# Patient Record
Sex: MALE | Race: WHITE | Employment: FULL TIME | URBAN - METROPOLITAN AREA
[De-identification: names, ages, dates, MRNs, and addresses within clinical notes are randomized per-mention and may not be internally consistent; named-entity substitution may affect disease eponyms.]

---

## 2022-09-01 ENCOUNTER — OFFICE VISIT (OUTPATIENT)
Dept: ORTHOPEDIC SURGERY | Age: 27
End: 2022-09-01
Payer: COMMERCIAL

## 2022-09-01 DIAGNOSIS — M79.644 PAIN OF FINGER OF RIGHT HAND: Primary | ICD-10-CM

## 2022-09-01 DIAGNOSIS — S61.202A UNSPECIFIED OPEN WOUND OF RIGHT MIDDLE FINGER WITHOUT DAMAGE TO NAIL, INITIAL ENCOUNTER: ICD-10-CM

## 2022-09-01 PROCEDURE — 99204 OFFICE O/P NEW MOD 45 MIN: CPT | Performed by: NURSE PRACTITIONER

## 2022-09-01 RX ORDER — SULFAMETHOXAZOLE AND TRIMETHOPRIM 800; 160 MG/1; MG/1
1 TABLET ORAL 2 TIMES DAILY
Qty: 14 TABLET | Refills: 0 | Status: SHIPPED | OUTPATIENT
Start: 2022-09-01 | End: 2022-09-08

## 2022-09-01 RX ORDER — ESCITALOPRAM OXALATE 20 MG/1
20 TABLET ORAL DAILY
COMMUNITY

## 2022-09-01 NOTE — PROGRESS NOTES
Orthopaedic Hand Clinic Note    Name: Sejal Lovelace  YOB: 1995  Gender: male  MRN: [id number]      CC: Patient referred for evaluation of right middle finger injury    HPI: Sejal Lovelace is a 32 y.o. male right hand dominant with a chief complaint of right middle finger injury. He reports he was rock climbing indoors approximately 2 weeks ago. He said he felt like he rubbed the skin causing a blister. He said he felt like the skin tore. He said since then he has had a red sensitive finger. He felt like something was trapped underneath. He said he tried to open it up. He said he got a small white object out. He says he thinks it was skin. He was seen in urgent care last night. He was referred to orthopedics for follow-up. He denies fever or chills. He has not been on any antibiotics. ROS/Meds/PSH/PMH/FH/SH: I personally reviewed the patients standard intake form. Pertinents are discussed in the HPI    Physical Examination:  General: Awake and alert. HEENT: Normocephalic, atraumatic  CV/Pulm: Breathing even and unlabored  Skin: No obvious rashes noted. Lymphatic: No obvious evidence of lymphedema or lymphadenopathy    Musculoskeletal Exam:  Examination on the right upper extremity demonstrates cap refill < 5 seconds in all fingers,   Patient has mild swelling to the right middle finger. There is a circular wound on the volar aspect of the right middle finger over the middle phalanx. There is some erythema. There is no drainage. He is tender to this area. He has decreased range of motion secondary to pain and swelling. He has full extension of his digits. All tendons are working. He denies paresthesia. He has good capillary refill. Imaging / Electrodiagnostic Tests:     Strays include a 3 view right middle finger are reviewed. No abnormality noted. Assessment:   1. Pain of finger of right hand    2.  Unspecified open wound of right middle finger without damage to nail, initial encounter        Plan:   We discussed the diagnosis and different treatment options. We discussed observation, therapy, antiinflammatory medications and other pertinent treatment modalities. After discussing in detail the patient elects to proceed with Xeroform dressing changes. We will give him a short course of Bactrim. I explained he needs to contact our office immediately if he develops increased pain, redness, drainage. I will see him back Monday or Tuesday to reassess his progress. We did discuss surgical intervention if this gets infected. Patient voiced accordance and understanding of the information provided and the formulated plan. All questions were answered to the patient's satisfaction during the encounter.     4 This is an acute complicated injury  Treatment at this time: Prescription medication and dressing changes    GABE Izquierdo CNP  Orthopaedic Surgery  09/01/22  8:47 AM

## 2022-09-06 ENCOUNTER — OFFICE VISIT (OUTPATIENT)
Dept: ORTHOPEDIC SURGERY | Age: 27
End: 2022-09-06
Payer: COMMERCIAL

## 2022-09-06 DIAGNOSIS — S61.202A UNSPECIFIED OPEN WOUND OF RIGHT MIDDLE FINGER WITHOUT DAMAGE TO NAIL, INITIAL ENCOUNTER: ICD-10-CM

## 2022-09-06 DIAGNOSIS — M79.644 PAIN OF FINGER OF RIGHT HAND: Primary | ICD-10-CM

## 2022-09-06 PROCEDURE — 99214 OFFICE O/P EST MOD 30 MIN: CPT | Performed by: NURSE PRACTITIONER

## 2022-09-06 RX ORDER — SULFAMETHOXAZOLE AND TRIMETHOPRIM 800; 160 MG/1; MG/1
1 TABLET ORAL 2 TIMES DAILY
Qty: 14 TABLET | Refills: 0 | Status: SHIPPED | OUTPATIENT
Start: 2022-09-06 | End: 2022-09-13

## 2022-09-06 RX ORDER — HYDROCODONE BITARTRATE AND ACETAMINOPHEN 5; 325 MG/1; MG/1
1 TABLET ORAL EVERY 6 HOURS PRN
Qty: 20 TABLET | Refills: 0 | Status: SHIPPED | OUTPATIENT
Start: 2022-09-06 | End: 2022-09-11

## 2022-09-07 PROBLEM — S61.202A UNSPECIFIED OPEN WOUND OF RIGHT MIDDLE FINGER WITHOUT DAMAGE TO NAIL, INITIAL ENCOUNTER: Status: ACTIVE | Noted: 2022-09-07

## 2022-09-07 PROBLEM — M79.644 PAIN OF FINGER OF RIGHT HAND: Status: ACTIVE | Noted: 2022-09-07

## 2022-09-07 NOTE — PROGRESS NOTES
Orthopaedic Hand Clinic Note    Name: Favian Lyles  YOB: 1995  Gender: male  MRN: [id number]       Follow up visit:   1. Pain of finger of right hand    2. Unspecified open wound of right middle finger without damage to nail, initial encounter        HPI: Favian Lyles is a 32 y.o. male who is following up for right middle finger wound. He has been on Bactrim for the last week. He notes that pus came out of his wound last night. He denies fever or chills. .      ROS/Meds/PSH/PMH/FH/SH: I personally reviewed the patients standard intake form. Pertinents are discussed in the HPI    Physical Examination:    Musculoskeletal Examination:  Examination on the right upper extremity demonstrates cap refill < 5 seconds in all fingers,   Patient has mild swelling to the right middle finger. There is a circular wound on the volar aspect of the right middle finger over the middle phalanx. There is surrounding erythema. There is no drainage. He is tender to this area. He has decreased range of motion secondary to pain and swelling. He has full extension of his digits. All tendons are working. He denies paresthesia. He has good capillary refill. This appears worse than his last visit. Imaging / Electrodiagnostic Tests:     None    Assessment:     ICD-10-CM    1. Pain of finger of right hand  M79.644 sulfamethoxazole-trimethoprim (BACTRIM DS) 800-160 MG per tablet     HYDROcodone-acetaminophen (NORCO) 5-325 MG per tablet      2. Unspecified open wound of right middle finger without damage to nail, initial encounter  S61.202A sulfamethoxazole-trimethoprim (BACTRIM DS) 800-160 MG per tablet     HYDROcodone-acetaminophen (NORCO) 5-325 MG per tablet          Plan:   We discussed the diagnosis and different treatment options. We discussed observation, therapy, antiinflammatory medications and other pertinent treatment modalities.     After discussing in detail the patient elects to proceed with irrigation and debridement by Dr. Naveen Thomas at the bedside. He will continue Bactrim. We will give him something for pain. We will reassess his wound on Friday September the night. After informed consent patient's right middle finger was prepped and draped sterile technique. .  8 cc of 2% lidocaine were injected prior to procedure. An incision was made over the wound. This was irrigated. His wound was left open. No foreign object was identified. A dressing consisting of Xeroform gauze, 4 x 4, Taylor Muta was applied. Patient tolerated well. Patient voiced accordance and understanding of the information provided and the formulated plan. All questions were answered to the patient's satisfaction during the encounter.     4 This is an acute complicated injury  Treatment at this time: Prescription medication and irrigation and debridement    GABE Archibald CNP  Orthopaedic Surgery  09/07/22  8:11 AM

## 2022-09-09 ENCOUNTER — OFFICE VISIT (OUTPATIENT)
Dept: ORTHOPEDIC SURGERY | Age: 27
End: 2022-09-09
Payer: COMMERCIAL

## 2022-09-09 DIAGNOSIS — S61.202A UNSPECIFIED OPEN WOUND OF RIGHT MIDDLE FINGER WITHOUT DAMAGE TO NAIL, INITIAL ENCOUNTER: Primary | ICD-10-CM

## 2022-09-09 PROCEDURE — 99213 OFFICE O/P EST LOW 20 MIN: CPT | Performed by: NURSE PRACTITIONER

## 2022-09-09 NOTE — LETTER
[address],[facility] [address]  Phone: [phone]  Fax: [phone]    GABE Dailey CNP        September 9, 2022     Patient: Jun Bowles   YOB: 1995   Date of Visit: 9/9/2022       To Whom It May Concern: It is my medical opinion that Jun Bowles may return to work on 9/12/22. If you have any questions or concerns, please don't hesitate to call.     Sincerely,        GABE Dailey CNP

## 2022-09-09 NOTE — PROGRESS NOTES
Orthopaedic Hand Clinic Note    Name: Yeimy Coyle  YOB: 1995  Gender: male  MRN: [id number]      Follow up visit:   1. Unspecified open wound of right middle finger without damage to nail, initial encounter        HPI: Yeimy Coyle is a 32 y.o. male who is following up for I&D right middle finger. He is here today for wound check. He is on Bactrim and tolerating it without difficulty. He says his pain is almost completely gone. He says he has full range of motion. He says his finger feels much better. .      ROS/Meds/PSH/PMH/FH/SH: I personally reviewed the patients standard intake form. Pertinents are discussed in the HPI    Physical Examination:    Musculoskeletal Examination:  Examination on the right upper extremity demonstrates cap refill < 5 seconds in all fingers,   Patient has mild swelling to the right middle finger. There is a wound on the volar aspect of the middle phalanx. It is scabbed. There is no surrounding erythema. There is no drainage. Patient is able to make a composite fist.  He has full extension of his digits. Imaging / Electrodiagnostic Tests:     None    Assessment:     ICD-10-CM    1. Unspecified open wound of right middle finger without damage to nail, initial encounter  S61.202A           Plan:   We discussed the diagnosis and different treatment options. We discussed observation, therapy, antiinflammatory medications and other pertinent treatment modalities. After discussing in detail the patient elects to proceed with continuing his Bactrim. He can wash this with warm soapy water. He can cover this with a Band-Aid. I will see him in 2 weeks. If he is completely better and has no concerns or questions he can cancel that appointment. Give him a work note. .     Patient voiced accordance and understanding of the information provided and the formulated plan. All questions were answered to the patient's satisfaction during the encounter.     Treatment at this time: Activities as tolerated    GABE Leslie CNP  Orthopaedic Surgery  09/09/22  8:07 AM